# Patient Record
Sex: FEMALE | Race: ASIAN | Employment: STUDENT
[De-identification: names, ages, dates, MRNs, and addresses within clinical notes are randomized per-mention and may not be internally consistent; named-entity substitution may affect disease eponyms.]

---

## 2017-02-02 PROBLEM — R87.810 ASCUS WITH POSITIVE HIGH RISK HPV CERVICAL: Status: ACTIVE | Noted: 2017-02-02

## 2017-02-02 PROBLEM — R87.610 ASCUS WITH POSITIVE HIGH RISK HPV CERVICAL: Status: ACTIVE | Noted: 2017-02-02

## 2017-02-02 PROBLEM — N76.0 BACTERIAL VAGINOSIS: Status: ACTIVE | Noted: 2017-02-02

## 2017-02-02 PROBLEM — B96.89 BACTERIAL VAGINOSIS: Status: ACTIVE | Noted: 2017-02-02

## 2017-02-03 ENCOUNTER — TELEPHONE (OUTPATIENT)
Dept: OBGYN | Facility: CLINIC | Age: 27
End: 2017-02-03

## 2018-05-22 ENCOUNTER — OFFICE VISIT (OUTPATIENT)
Dept: INTERNAL MEDICINE CLINIC | Age: 28
End: 2018-05-22

## 2018-05-22 VITALS
WEIGHT: 110 LBS | DIASTOLIC BLOOD PRESSURE: 52 MMHG | HEIGHT: 62 IN | BODY MASS INDEX: 20.24 KG/M2 | OXYGEN SATURATION: 98 % | SYSTOLIC BLOOD PRESSURE: 88 MMHG | HEART RATE: 90 BPM | RESPIRATION RATE: 18 BRPM

## 2018-05-22 DIAGNOSIS — I95.89 HYPOTENSION, CHRONIC: ICD-10-CM

## 2018-05-22 DIAGNOSIS — Z00.00 ANNUAL PHYSICAL EXAM: Primary | ICD-10-CM

## 2018-05-22 DIAGNOSIS — Z11.1 SCREENING FOR TUBERCULOSIS: ICD-10-CM

## 2018-05-22 PROCEDURE — 99385 PREV VISIT NEW AGE 18-39: CPT | Performed by: INTERNAL MEDICINE

## 2018-05-22 PROCEDURE — 86580 TB INTRADERMAL TEST: CPT | Performed by: INTERNAL MEDICINE

## 2018-05-22 RX ORDER — DROSPIRENONE AND ETHINYL ESTRADIOL 0.02-3(28)
KIT ORAL
COMMUNITY
Start: 2018-05-09

## 2018-05-24 ENCOUNTER — HOSPITAL ENCOUNTER (OUTPATIENT)
Dept: OTHER | Age: 28
Discharge: HOME | End: 2018-05-24
Attending: INTERNAL MEDICINE | Admitting: INTERNAL MEDICINE

## 2018-05-24 ENCOUNTER — NURSE ONLY (OUTPATIENT)
Dept: INTERNAL MEDICINE CLINIC | Age: 28
End: 2018-05-24

## 2018-05-24 DIAGNOSIS — Z00.00 ANNUAL PHYSICAL EXAM: ICD-10-CM

## 2018-05-24 DIAGNOSIS — R91.1 PULMONARY NODULE, LEFT: Primary | ICD-10-CM

## 2018-05-24 DIAGNOSIS — Z11.1 ENCOUNTER FOR PPD SKIN TEST READING: Primary | ICD-10-CM

## 2018-05-24 DIAGNOSIS — Z11.1 TUBERCULOSIS SCREENING: ICD-10-CM

## 2018-05-24 DIAGNOSIS — Z11.1 SCREENING FOR TUBERCULOSIS: Primary | ICD-10-CM

## 2018-05-24 LAB
A/G RATIO: 1.1 (ref 1.1–2.2)
ALBUMIN SERPL-MCNC: 3.8 G/DL (ref 3.4–5)
ALP BLD-CCNC: 66 U/L (ref 40–129)
ALT SERPL-CCNC: 10 U/L (ref 10–40)
ANION GAP SERPL CALCULATED.3IONS-SCNC: 14 MMOL/L (ref 3–16)
AST SERPL-CCNC: 14 U/L (ref 15–37)
BASOPHILS ABSOLUTE: 0 K/UL (ref 0–0.2)
BASOPHILS RELATIVE PERCENT: 0.8 %
BILIRUB SERPL-MCNC: <0.2 MG/DL (ref 0–1)
BUN BLDV-MCNC: 9 MG/DL (ref 7–20)
CALCIUM SERPL-MCNC: 9 MG/DL (ref 8.3–10.6)
CHLORIDE BLD-SCNC: 101 MMOL/L (ref 99–110)
CHOLESTEROL, TOTAL: 208 MG/DL (ref 0–199)
CO2: 22 MMOL/L (ref 21–32)
CREAT SERPL-MCNC: 0.8 MG/DL (ref 0.6–1.1)
EOSINOPHILS ABSOLUTE: 0.1 K/UL (ref 0–0.6)
EOSINOPHILS RELATIVE PERCENT: 1.5 %
GFR AFRICAN AMERICAN: >60
GFR NON-AFRICAN AMERICAN: >60
GLOBULIN: 3.5 G/DL
GLUCOSE BLD-MCNC: 79 MG/DL (ref 70–99)
HCT VFR BLD CALC: 39.4 % (ref 36–48)
HDLC SERPL-MCNC: 94 MG/DL (ref 40–60)
HEMOGLOBIN: 13.4 G/DL (ref 12–16)
INDURATION: ABNORMAL
LDL CHOLESTEROL CALCULATED: 90 MG/DL
LYMPHOCYTES ABSOLUTE: 2.6 K/UL (ref 1–5.1)
LYMPHOCYTES RELATIVE PERCENT: 44 %
MCH RBC QN AUTO: 31 PG (ref 26–34)
MCHC RBC AUTO-ENTMCNC: 34 G/DL (ref 31–36)
MCV RBC AUTO: 90.9 FL (ref 80–100)
MONOCYTES ABSOLUTE: 0.4 K/UL (ref 0–1.3)
MONOCYTES RELATIVE PERCENT: 6.5 %
NEUTROPHILS ABSOLUTE: 2.8 K/UL (ref 1.7–7.7)
NEUTROPHILS RELATIVE PERCENT: 47.2 %
PDW BLD-RTO: 13.4 % (ref 12.4–15.4)
PLATELET # BLD: 284 K/UL (ref 135–450)
PMV BLD AUTO: 10 FL (ref 5–10.5)
POTASSIUM SERPL-SCNC: 4.1 MMOL/L (ref 3.5–5.1)
RBC # BLD: 4.33 M/UL (ref 4–5.2)
SODIUM BLD-SCNC: 137 MMOL/L (ref 136–145)
TB SKIN TEST: ABNORMAL
TOTAL PROTEIN: 7.3 G/DL (ref 6.4–8.2)
TRIGL SERPL-MCNC: 121 MG/DL (ref 0–150)
VLDLC SERPL CALC-MCNC: 24 MG/DL
WBC # BLD: 6 K/UL (ref 4–11)

## 2018-05-31 ENCOUNTER — HOSPITAL ENCOUNTER (OUTPATIENT)
Dept: CT IMAGING | Age: 28
Discharge: HOME | End: 2018-05-31
Attending: INTERNAL MEDICINE | Admitting: INTERNAL MEDICINE

## 2018-05-31 DIAGNOSIS — R91.1 SOLITARY PULMONARY NODULE: ICD-10-CM

## 2018-05-31 DIAGNOSIS — R91.1 PULMONARY NODULE, LEFT: ICD-10-CM

## 2018-07-24 ENCOUNTER — TELEPHONE (OUTPATIENT)
Dept: INTERNAL MEDICINE CLINIC | Age: 28
End: 2018-07-24

## 2018-07-24 NOTE — TELEPHONE ENCOUNTER
Patient called requesting prescription to be transferred to [facility]. Informed patient she can call her previous pharmacy and request it herself and to call if any problems. Patient called back and stated it is actually a prescription for her birth control and Dr Roberto has not prescribed it. Stated Dr Roberto would need to see patient, especially since she has not had a pap by her. Asked patient if she has an OB/GYN? She states yes. Informed her to call them to refill and if any problems to call us back.

## 2022-12-21 ENCOUNTER — TELEMEDICINE (OUTPATIENT)
Dept: INTERNAL MEDICINE CLINIC | Age: 32
End: 2022-12-21

## 2022-12-21 DIAGNOSIS — M25.50 ARTHRALGIA OF MULTIPLE SITES, BILATERAL: Primary | ICD-10-CM

## 2022-12-21 RX ORDER — MELOXICAM 15 MG/1
15 TABLET ORAL DAILY
Qty: 30 TABLET | Refills: 0 | Status: SHIPPED | OUTPATIENT
Start: 2022-12-21

## 2022-12-21 SDOH — ECONOMIC STABILITY: FOOD INSECURITY: WITHIN THE PAST 12 MONTHS, YOU WORRIED THAT YOUR FOOD WOULD RUN OUT BEFORE YOU GOT MONEY TO BUY MORE.: NEVER TRUE

## 2022-12-21 SDOH — ECONOMIC STABILITY: FOOD INSECURITY: WITHIN THE PAST 12 MONTHS, THE FOOD YOU BOUGHT JUST DIDN'T LAST AND YOU DIDN'T HAVE MONEY TO GET MORE.: NEVER TRUE

## 2022-12-21 ASSESSMENT — SOCIAL DETERMINANTS OF HEALTH (SDOH): HOW HARD IS IT FOR YOU TO PAY FOR THE VERY BASICS LIKE FOOD, HOUSING, MEDICAL CARE, AND HEATING?: NOT HARD AT ALL

## 2022-12-21 ASSESSMENT — PATIENT HEALTH QUESTIONNAIRE - PHQ9
2. FEELING DOWN, DEPRESSED OR HOPELESS: 0
SUM OF ALL RESPONSES TO PHQ QUESTIONS 1-9: 0

## 2022-12-21 NOTE — PROGRESS NOTES
Pursuant to the emergency declaration under the [facility] authority and the [employer] and [employer] Act, this Virtual  Video Visit was conducted, with patient's consent, to reduce the patient's risk of exposure to COVID-19 and provide continuity of care. Service is  provided through a video synchronous discussion virtually to substitute for in-person clinic visit with the patient being at home and Dr. Tiana Gupta being at home. Patient consent to the video visit. Date of Service:  12/21/2022    Chief Complaint:      Chief Complaint   Patient presents with    Hip Pain    Wrist Pain       Assessment/Plan:    Dian was seen today for hip pain and wrist pain. Diagnoses and all orders for this visit:    Arthralgia of multiple sites, bilateral  -     meloxicam (MOBIC) 15 MG tablet; Take 1 tablet by mouth daily    Patient to find a rheumatologist in [place] since she will not be home till March    Return Fasting Physical when back in town in March. HPI:  Lydia Gonzalez is a 28 y.o. She got out of the sofa a week ago with right pain and resolve in 5 mins and recur in a few days. The next morning she started having pain in the other hip. She went to Urgent care and given ibuprofen 400 mg bid and flexeril 5 mg daily a few days ago but stop since they didn't help and was upsetting her stomach. Yesterday, she started having bilateral hip pain. [place] She had full body nerve pain in April and she saw a neurologist that did MRI back and labs ok except for CRP elevated with negative ERICK and RF. No history of tick bite or family history connective tissue disease.     No results found for: LABA1C, LABMICR  Lab Results   Component Value Date     05/24/2018    K 4.1 05/24/2018     05/24/2018    CO2 22 05/24/2018    BUN 9 05/24/2018    CREATININE 0.8 05/24/2018    GLUCOSE 79 05/24/2018 CALCIUM 9.0 05/24/2018     Lab Results   Component Value Date/Time    CHOL 208 05/24/2018 01:18 PM    TRIG 121 05/24/2018 01:18 PM    HDL 94 05/24/2018 01:18 PM    LDLCALC 90 05/24/2018 01:18 PM     Lab Results   Component Value Date    ALT 10 05/24/2018    AST 14 (L) 05/24/2018     No results found for: TSH, T4FREE, T3FREE  Lab Results   Component Value Date    WBC 6.0 05/24/2018    HGB 13.4 05/24/2018    HCT 39.4 05/24/2018    MCV 90.9 05/24/2018     05/24/2018     No results found for: INR   No results found for: PSA   No results found for: [facility]     Patient Active Problem List   Diagnosis    ASCUS +HRHPV 9/26/16    H/O bacterial vaginosis    Hypotension, chronic    Pulmonary nodule, left       No Known Allergies  Outpatient Medications Marked as Taking for the 12/21/22 encounter (Telemedicine) with Jessica Malone MD   Medication Sig Dispense Refill    drospirenone-ethinyl estradiol (BRIANNA) 3-0.02 MG per tablet            Review of Systems: 14 systems were negative except of what was stated on HPI    Nursing note and vitals reviewed. There were no vitals filed for this visit. Wt Readings from Last 3 Encounters:   05/22/18 110 lb (49.9 kg)     BP Readings from Last 3 Encounters:   05/22/18 (!) 88/52     There is no height or weight on file to calculate BMI. Constitutional: Patient appears well-developed and well-nourished. No distress. Head: Normocephalic and atraumatic. Psychiatric: Normal mood and affect.  Behavior is normal.

## 2023-06-28 ENCOUNTER — OFFICE VISIT (OUTPATIENT)
Dept: INTERNAL MEDICINE CLINIC | Age: 33
End: 2023-06-28
Payer: COMMERCIAL

## 2023-06-28 VITALS
SYSTOLIC BLOOD PRESSURE: 84 MMHG | BODY MASS INDEX: 20.98 KG/M2 | OXYGEN SATURATION: 99 % | WEIGHT: 114 LBS | HEIGHT: 62 IN | HEART RATE: 84 BPM | DIASTOLIC BLOOD PRESSURE: 52 MMHG

## 2023-06-28 DIAGNOSIS — Z00.00 ANNUAL PHYSICAL EXAM: Primary | ICD-10-CM

## 2023-06-28 DIAGNOSIS — Z30.41 ENCOUNTER FOR SURVEILLANCE OF CONTRACEPTIVE PILLS: ICD-10-CM

## 2023-06-28 PROBLEM — R91.1 PULMONARY NODULE, LEFT: Status: RESOLVED | Noted: 2018-05-24 | Resolved: 2023-06-28

## 2023-06-28 PROBLEM — R87.610 ASCUS WITH POSITIVE HIGH RISK HPV CERVICAL: Status: RESOLVED | Noted: 2017-02-02 | Resolved: 2023-06-28

## 2023-06-28 PROBLEM — N76.0 BACTERIAL VAGINOSIS: Status: RESOLVED | Noted: 2017-02-02 | Resolved: 2023-06-28

## 2023-06-28 PROBLEM — B96.89 BACTERIAL VAGINOSIS: Status: RESOLVED | Noted: 2017-02-02 | Resolved: 2023-06-28

## 2023-06-28 PROBLEM — R87.810 ASCUS WITH POSITIVE HIGH RISK HPV CERVICAL: Status: RESOLVED | Noted: 2017-02-02 | Resolved: 2023-06-28

## 2023-06-28 PROCEDURE — 99395 PREV VISIT EST AGE 18-39: CPT | Performed by: INTERNAL MEDICINE

## 2023-06-28 RX ORDER — DROSPIRENONE AND ETHINYL ESTRADIOL 0.02-3(28)
1 KIT ORAL DAILY
Qty: 3 PACKET | Refills: 3 | Status: SHIPPED | OUTPATIENT
Start: 2023-06-28

## 2023-06-28 SDOH — ECONOMIC STABILITY: HOUSING INSECURITY
IN THE LAST 12 MONTHS, WAS THERE A TIME WHEN YOU DID NOT HAVE A STEADY PLACE TO SLEEP OR SLEPT IN A SHELTER (INCLUDING NOW)?: NO

## 2023-06-28 SDOH — ECONOMIC STABILITY: FOOD INSECURITY: WITHIN THE PAST 12 MONTHS, YOU WORRIED THAT YOUR FOOD WOULD RUN OUT BEFORE YOU GOT MONEY TO BUY MORE.: NEVER TRUE

## 2023-06-28 SDOH — ECONOMIC STABILITY: INCOME INSECURITY: HOW HARD IS IT FOR YOU TO PAY FOR THE VERY BASICS LIKE FOOD, HOUSING, MEDICAL CARE, AND HEATING?: NOT HARD AT ALL

## 2023-06-28 SDOH — ECONOMIC STABILITY: FOOD INSECURITY: WITHIN THE PAST 12 MONTHS, THE FOOD YOU BOUGHT JUST DIDN'T LAST AND YOU DIDN'T HAVE MONEY TO GET MORE.: NEVER TRUE

## 2023-06-28 ASSESSMENT — PATIENT HEALTH QUESTIONNAIRE - PHQ9
SUM OF ALL RESPONSES TO PHQ QUESTIONS 1-9: 0
1. LITTLE INTEREST OR PLEASURE IN DOING THINGS: 0
SUM OF ALL RESPONSES TO PHQ QUESTIONS 1-9: 0
SUM OF ALL RESPONSES TO PHQ QUESTIONS 1-9: 0
SUM OF ALL RESPONSES TO PHQ9 QUESTIONS 1 & 2: 0
SUM OF ALL RESPONSES TO PHQ QUESTIONS 1-9: 0
2. FEELING DOWN, DEPRESSED OR HOPELESS: 0

## 2024-07-08 ENCOUNTER — TELEPHONE (OUTPATIENT)
Dept: INTERNAL MEDICINE CLINIC | Age: 34
End: 2024-07-08

## 2024-07-08 NOTE — TELEPHONE ENCOUNTER
Patient call was transferred from [facility] for red flag word.     Patient states she has been having heart fluttering, but only with exercise, ongoing for 1 year. Patient feels it is not urgent. Currently not having any symptoms. She is also requesting refills of birth control.   Scheduled patient for appointment for 07/10/2024

## 2024-07-10 ENCOUNTER — OFFICE VISIT (OUTPATIENT)
Dept: INTERNAL MEDICINE CLINIC | Age: 34
End: 2024-07-10
Payer: COMMERCIAL

## 2024-07-10 VITALS
DIASTOLIC BLOOD PRESSURE: 58 MMHG | OXYGEN SATURATION: 98 % | WEIGHT: 119 LBS | SYSTOLIC BLOOD PRESSURE: 92 MMHG | HEART RATE: 91 BPM | BODY MASS INDEX: 21.9 KG/M2 | HEIGHT: 62 IN

## 2024-07-10 DIAGNOSIS — R00.2 PALPITATION: ICD-10-CM

## 2024-07-10 DIAGNOSIS — Z30.41 ENCOUNTER FOR SURVEILLANCE OF CONTRACEPTIVE PILLS: ICD-10-CM

## 2024-07-10 DIAGNOSIS — Z00.00 PREVENTATIVE HEALTH CARE: Primary | ICD-10-CM

## 2024-07-10 PROCEDURE — 99213 OFFICE O/P EST LOW 20 MIN: CPT | Performed by: INTERNAL MEDICINE

## 2024-07-10 PROCEDURE — 99395 PREV VISIT EST AGE 18-39: CPT | Performed by: INTERNAL MEDICINE

## 2024-07-10 RX ORDER — DROSPIRENONE AND ETHINYL ESTRADIOL 0.02-3(28)
1 KIT ORAL DAILY
Qty: 1 PACKET | Refills: 2 | Status: SHIPPED | OUTPATIENT
Start: 2024-07-10

## 2024-07-10 SDOH — ECONOMIC STABILITY: FOOD INSECURITY: WITHIN THE PAST 12 MONTHS, YOU WORRIED THAT YOUR FOOD WOULD RUN OUT BEFORE YOU GOT MONEY TO BUY MORE.: NEVER TRUE

## 2024-07-10 SDOH — ECONOMIC STABILITY: FOOD INSECURITY: WITHIN THE PAST 12 MONTHS, THE FOOD YOU BOUGHT JUST DIDN'T LAST AND YOU DIDN'T HAVE MONEY TO GET MORE.: NEVER TRUE

## 2024-07-10 SDOH — ECONOMIC STABILITY: INCOME INSECURITY: HOW HARD IS IT FOR YOU TO PAY FOR THE VERY BASICS LIKE FOOD, HOUSING, MEDICAL CARE, AND HEATING?: NOT HARD AT ALL

## 2024-07-10 ASSESSMENT — PATIENT HEALTH QUESTIONNAIRE - PHQ9
SUM OF ALL RESPONSES TO PHQ QUESTIONS 1-9: 0
SUM OF ALL RESPONSES TO PHQ9 QUESTIONS 1 & 2: 0
SUM OF ALL RESPONSES TO PHQ QUESTIONS 1-9: 0
2. FEELING DOWN, DEPRESSED OR HOPELESS: NOT AT ALL
1. LITTLE INTEREST OR PLEASURE IN DOING THINGS: NOT AT ALL

## 2024-07-10 NOTE — PROGRESS NOTES
[facility]  History and Physical  Teresa Roberto M.D.        Dian Gomez  YOB: 1990    Date of Service:  7/10/2024    Chief Complaint:   Dian Gomez is a 34 y.o. female who presents for   Chief Complaint   Patient presents with    Palpitations    Annual Exam       Assessment/Plan:    Dian was seen today for palpitations and annual exam.    Diagnoses and all orders for this visit:    Preventative health care  -     CBC with Auto Differential; Future  -     Lipid Panel; Future  -     Comprehensive Metabolic Panel; Future  -     TSH; Future    Encounter for surveillance of contraceptive pills  -     drospirenone-ethinyl estradiol (BRIANNA) 3-0.02 MG per tablet; Take 1 tablet by mouth daily    Palpitation  -     [facility]  -     TSH; Future    Patient referred to Dr. Awadalla for freezing her egg      Return Fasting PE in 1 year.      HPI: Here for Annual Physical and Follow up.    She complain of heart fluttering and irregular intermittently while exercising for about a year.  No family history heart issue.    No results found for: \"LABA1C\"  Lab Results   Component Value Date     05/24/2018    K 4.1 05/24/2018     05/24/2018    CO2 22 05/24/2018    BUN 9 05/24/2018    CREATININE 0.8 05/24/2018    GLUCOSE 79 05/24/2018    CALCIUM 9.0 05/24/2018     Lab Results   Component Value Date/Time    CHOL 208 05/24/2018 01:18 PM    TRIG 121 05/24/2018 01:18 PM    HDL 94 05/24/2018 01:18 PM     Lab Results   Component Value Date    ALT 10 05/24/2018    AST 14 (L) 05/24/2018     No results found for: \"TSH\", \"T4FREE\", \"T3FREE\"  Lab Results   Component Value Date    WBC 6.0 05/24/2018    HGB 13.4 05/24/2018    HCT 39.4 05/24/2018    MCV 90.9 05/24/2018     05/24/2018     No results found for: \"INR\"   No results found for: \"PSA\"   No results found for: \"LABURIC\"     Wt Readings from Last 3 Encounters:   07/10/24 54 kg (119 lb)   06/28/23 51.7 kg (114 lb)   05/22/18

## 2024-07-19 LAB
ALBUMIN: 4.2 G/DL
ALP BLD-CCNC: 74 U/L
ALT SERPL-CCNC: 11 U/L
ANION GAP SERPL CALCULATED.3IONS-SCNC: NORMAL MMOL/L
AST SERPL-CCNC: 12 U/L
BASOPHILS ABSOLUTE: 0 /ΜL
BASOPHILS RELATIVE PERCENT: 1 %
BILIRUB SERPL-MCNC: 0.3 MG/DL (ref 0.1–1.4)
BUN BLDV-MCNC: 11 MG/DL
CALCIUM SERPL-MCNC: 9.7 MG/DL
CHLORIDE BLD-SCNC: 102 MMOL/L
CHOLESTEROL, TOTAL: 211 MG/DL
CHOLESTEROL/HDL RATIO: ABNORMAL
CO2: 22 MMOL/L
CREAT SERPL-MCNC: 0.91 MG/DL
EOSINOPHILS ABSOLUTE: 0.1 /ΜL
EOSINOPHILS RELATIVE PERCENT: 2 %
GFR, ESTIMATED: 85
GLUCOSE BLD-MCNC: 93 MG/DL
HCT VFR BLD CALC: 38.1 % (ref 36–46)
HDLC SERPL-MCNC: 79 MG/DL (ref 35–70)
HEMOGLOBIN: 12.6 G/DL (ref 12–16)
LDL CHOLESTEROL: 104
LYMPHOCYTES ABSOLUTE: 2.7 /ΜL
LYMPHOCYTES RELATIVE PERCENT: 44 %
MCH RBC QN AUTO: 30.2 PG
MCHC RBC AUTO-ENTMCNC: 33.1 G/DL
MCV RBC AUTO: 91 FL
MONOCYTES ABSOLUTE: 0.4 /ΜL
MONOCYTES RELATIVE PERCENT: 7 %
NEUTROPHILS ABSOLUTE: 2.9 /ΜL
NEUTROPHILS RELATIVE PERCENT: 46 %
NONHDLC SERPL-MCNC: ABNORMAL MG/DL
PDW BLD-RTO: 13.5 %
PLATELET # BLD: 315 K/ΜL
PMV BLD AUTO: NORMAL FL
POTASSIUM SERPL-SCNC: 4.6 MMOL/L
RBC # BLD: 4.17 10^6/ΜL
SODIUM BLD-SCNC: 136 MMOL/L
TOTAL PROTEIN: 7.1 G/DL (ref 6.4–8.2)
TRIGL SERPL-MCNC: 165 MG/DL
TSH SERPL DL<=0.05 MIU/L-ACNC: 1.44 UIU/ML
VLDLC SERPL CALC-MCNC: 28 MG/DL
WBC # BLD: 6.2 10^3/ML

## 2024-08-30 ENCOUNTER — OFFICE VISIT (OUTPATIENT)
Dept: CARDIOLOGY CLINIC | Age: 34
End: 2024-08-30

## 2024-08-30 ENCOUNTER — TELEPHONE (OUTPATIENT)
Dept: CARDIOLOGY CLINIC | Age: 34
End: 2024-08-30

## 2024-08-30 ENCOUNTER — ANCILLARY PROCEDURE (OUTPATIENT)
Dept: CARDIOLOGY CLINIC | Age: 34
End: 2024-08-30
Payer: COMMERCIAL

## 2024-08-30 VITALS
SYSTOLIC BLOOD PRESSURE: 94 MMHG | HEIGHT: 62 IN | HEART RATE: 99 BPM | OXYGEN SATURATION: 97 % | WEIGHT: 118 LBS | BODY MASS INDEX: 21.71 KG/M2 | DIASTOLIC BLOOD PRESSURE: 54 MMHG

## 2024-08-30 DIAGNOSIS — R00.2 PALPITATIONS: ICD-10-CM

## 2024-08-30 DIAGNOSIS — Z76.89 ESTABLISHING CARE WITH NEW DOCTOR, ENCOUNTER FOR: Primary | ICD-10-CM

## 2024-08-30 PROCEDURE — 93270 REMOTE 30 DAY ECG REV/REPORT: CPT | Performed by: INTERNAL MEDICINE

## 2024-08-30 NOTE — TELEPHONE ENCOUNTER
Monitor placed by [employer]  Monitor company [employer]  Length of monitor 14 days  Monitor ordered by [employer]  Serial number [id number]  Kit ID [id number], [id number]  Activation successful prior to pt leaving office? Yes

## 2024-09-20 LAB — ECHO BSA: 1.53 M2

## 2024-09-20 PROCEDURE — 93272 ECG/REVIEW INTERPRET ONLY: CPT | Performed by: INTERNAL MEDICINE

## 2024-10-14 DIAGNOSIS — Z30.41 ENCOUNTER FOR SURVEILLANCE OF CONTRACEPTIVE PILLS: ICD-10-CM

## 2024-10-14 RX ORDER — DROSPIRENONE AND ETHINYL ESTRADIOL 0.02-3(28)
1 KIT ORAL DAILY
Qty: 28 TABLET | Refills: 9 | Status: SHIPPED | OUTPATIENT
Start: 2024-10-14

## 2024-10-14 NOTE — TELEPHONE ENCOUNTER
LAST REFILL 7/10/24  AMOUNT 1pk     2 REFILLS  LAST VISIT 7/10/24  NEXT VISIT 7/10/25 is when she is due next however hasn't made an appointment yet

## 2024-11-20 ENCOUNTER — OFFICE VISIT (OUTPATIENT)
Dept: INTERNAL MEDICINE CLINIC | Age: 34
End: 2024-11-20

## 2024-11-20 VITALS
HEIGHT: 62 IN | BODY MASS INDEX: 22.26 KG/M2 | HEART RATE: 98 BPM | SYSTOLIC BLOOD PRESSURE: 102 MMHG | OXYGEN SATURATION: 98 % | DIASTOLIC BLOOD PRESSURE: 62 MMHG | WEIGHT: 121 LBS

## 2024-11-20 DIAGNOSIS — R22.0 FACIAL SWELLING: Primary | ICD-10-CM

## 2024-11-20 NOTE — PROGRESS NOTES
Dian Gomez  YOB: 1990    Date of Service:  11/20/2024    Chief Complaint:      Chief Complaint   Patient presents with    Facial Pain     Patient says that she has been having some redness and swelling in the mornings for the last five days and it starts to come down throughout the day but its still painful. Had been trying different face washes and then it started a few days later.        Assessment/Plan:  Dian was seen today for facial pain.    Diagnoses and all orders for this visit:    Facial swelling    Start zyrtec 10 mg qhs    Return if symptoms worsen or fail to improve.      HPI:  Dian Gomez is a 34 y.o.    She was at her friend's house a week ago and used different soap and moisturizer.  Her face started getting flush and red a few days after being at her friend's house.  No need food.  No shortness of breath.  She was concern for Rosacea.  She's been taking Benadryl as needed but it's sedating.    No results found for: \"LABA1C\"  Lab Results   Component Value Date     07/19/2024    K 4.6 07/19/2024     07/19/2024    CO2 22 07/19/2024    BUN 11 07/19/2024    CREATININE 0.91 07/19/2024    GLUCOSE 93 07/19/2024    CALCIUM 9.7 07/19/2024     Lab Results   Component Value Date/Time    CHOL 211 07/19/2024 12:00 AM    TRIG 165 07/19/2024 12:00 AM    HDL 79 07/19/2024 12:00 AM     Lab Results   Component Value Date    ALT 11 07/19/2024    AST 12 07/19/2024     Lab Results   Component Value Date    TSH 1.440 07/19/2024     Lab Results   Component Value Date    WBC 6.2 07/19/2024    HGB 12.6 07/19/2024    HCT 38.1 07/19/2024    MCV 91 07/19/2024     07/19/2024     No results found for: \"INR\"   No results found for: \"PSA\"   No results found for: \"LABURIC\"     Patient Active Problem List   Diagnosis    Hypotension, chronic       No Known Allergies  Outpatient Medications Marked as Taking for the 11/20/24 encounter (Office Visit) with Teresa Roberto MD   Medication Sig Dispense

## 2024-11-20 NOTE — PATIENT INSTRUCTIONS
[facility]:  [phone]  [facility] (8 locations):  [phone]  [facility] [phone]   [facility] [phone]

## 2024-12-02 ENCOUNTER — TELEMEDICINE (OUTPATIENT)
Dept: INTERNAL MEDICINE CLINIC | Age: 34
End: 2024-12-02
Payer: COMMERCIAL

## 2024-12-02 DIAGNOSIS — R23.2 FACIAL FLUSHING: Primary | ICD-10-CM

## 2024-12-02 PROCEDURE — 99213 OFFICE O/P EST LOW 20 MIN: CPT | Performed by: INTERNAL MEDICINE

## 2024-12-02 NOTE — PATIENT INSTRUCTIONS
Allergy & Asthma Dr. Sandhya Liz or Dr. Yinka De La Cruz  [phone]  over at the [address] [place] [address]

## 2024-12-02 NOTE — PROGRESS NOTES
Patient was evaluated through a synchronous (real-time) video encounter. Patient identification was verified at the start of the visit. She (or guardian if applicable) is aware that this is a billable service, which includes applicable co-pays. This visit was conducted with the patient's (and/or legal guardian's) verbal consent. She has not had a related appointment within my department in the past 7 days or scheduled within the next 24 hours.   The patient was located at Home: [address] [place] [id number].  The provider was located at Home (Appt Dept State): [place].    Date of Service:  12/2/2024    Chief Complaint:      Chief Complaint   Patient presents with    Facial Swelling       Assessment/Plan:    Dian was seen today for facial swelling.    Diagnoses and all orders for this visit:    Facial flushing    Change to claritin or allegra that's not as sedating.  Follow up with Allergy & Asthma Dr. Sandhya Liz or Dr. Yinka De La Cruz  [phone]  over at the [address] [place] [address] if not better.      Return if symptoms worsen or fail to improve.      HPI:  Dian Gomez is a 34 y.o.      The heat from the vent at work still causes some facial flushing/swelling and pain although pain is not as bad as before.  She was off her birth control pills when the facial flushing started and she just restarted 6 days ago.  She also went to a Dermatologist and started on doxycycline and some cream but the cream was irritating to her skin.  She's taking zyrtec but get's her too tired.    No results found for: \"LABA1C\"  Lab Results   Component Value Date     07/19/2024    K 4.6 07/19/2024     07/19/2024    CO2 22 07/19/2024    BUN 11 07/19/2024    CREATININE 0.91 07/19/2024    GLUCOSE 93 07/19/2024    CALCIUM 9.7 07/19/2024     Lab Results   Component Value Date/Time    CHOL 211 07/19/2024 12:00 AM    TRIG 165 07/19/2024 12:00 AM    HDL 79 07/19/2024 12:00 AM     Lab Results   Component Value Date    ALT 11 07/19/2024

## 2025-01-15 ENCOUNTER — HOSPITAL ENCOUNTER (OUTPATIENT)
Age: 35
Discharge: HOME | End: 2025-01-15
Payer: COMMERCIAL

## 2025-01-15 ENCOUNTER — TELEMEDICINE (OUTPATIENT)
Dept: INTERNAL MEDICINE CLINIC | Age: 35
End: 2025-01-15
Payer: COMMERCIAL

## 2025-01-15 DIAGNOSIS — R23.2 FACIAL FLUSHING: Primary | ICD-10-CM

## 2025-01-15 DIAGNOSIS — R23.2 FACIAL FLUSHING: ICD-10-CM

## 2025-01-15 LAB — CRP SERPL-MCNC: 10.2 MG/L (ref 0–5.1)

## 2025-01-15 PROCEDURE — 36415 COLL VENOUS BLD VENIPUNCTURE: CPT

## 2025-01-15 PROCEDURE — 86140 C-REACTIVE PROTEIN: CPT

## 2025-01-15 PROCEDURE — 99213 OFFICE O/P EST LOW 20 MIN: CPT | Performed by: INTERNAL MEDICINE

## 2025-01-15 NOTE — PROGRESS NOTES
Patient was evaluated through a synchronous (real-time) video encounter. Patient identification was verified at the start of the visit. She (or guardian if applicable) is aware that this is a billable service, which includes applicable co-pays. This visit was conducted with the patient's (and/or legal guardian's) verbal consent. She has not had a related appointment within my department in the past 7 days or scheduled within the next 24 hours.   The patient was located at Home: [address] [place] [id number].  The provider was located at Facility (Appt Dept): [address] [place] [address], [address]  [place] [address].    Date of Service:  1/15/2025    Chief Complaint:      Chief Complaint   Patient presents with    facial flushing    follow up     After visits with dermatology and allergist       Assessment/Plan:    Dian was seen today for facial flushing and follow up.    Diagnoses and all orders for this visit:    Facial flushing  -     H. Pylori Breath Test; Future  -     C-Reactive Protein; Future      Return if symptoms worsen or fail to improve.      HPI:  Dian Gomez is a 34 y.o.      She saw 3 different dermatologists and an allergist and told to follow up with me to further testing.  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  She also wants to repeat CRP since it was elevated in 2022.  Patient states her facial flushing has been very irritating and affecting her job.    No results found for: \"LABA1C\"  Lab Results   Component Value Date     07/19/2024    K 4.6 07/19/2024     07/19/2024    CO2 22 07/19/2024    BUN 11 07/19/2024    CREATININE 0.91 07/19/2024    GLUCOSE 93 07/19/2024    CALCIUM 9.7 07/19/2024     Lab Results   Component Value Date/Time    CHOL 211 07/19/2024 12:00 AM    TRIG 165 07/19/2024 12:00 AM    HDL 79 07/19/2024 12:00 AM     Lab Results   Component Value

## 2025-01-17 ENCOUNTER — OFFICE VISIT (OUTPATIENT)
Dept: OBGYN CLINIC | Age: 35
End: 2025-01-17

## 2025-01-17 VITALS
DIASTOLIC BLOOD PRESSURE: 60 MMHG | SYSTOLIC BLOOD PRESSURE: 108 MMHG | HEART RATE: 92 BPM | BODY MASS INDEX: 20.74 KG/M2 | WEIGHT: 113.4 LBS | OXYGEN SATURATION: 99 %

## 2025-01-17 DIAGNOSIS — Z12.4 PAP SMEAR FOR CERVICAL CANCER SCREENING: ICD-10-CM

## 2025-01-17 DIAGNOSIS — Z01.419 ENCOUNTER FOR ANNUAL ROUTINE GYNECOLOGICAL EXAMINATION: Primary | ICD-10-CM

## 2025-01-17 DIAGNOSIS — R23.2 FACIAL FLUSHING: ICD-10-CM

## 2025-01-17 DIAGNOSIS — N92.6 IRREGULAR MENSES: ICD-10-CM

## 2025-01-17 RX ORDER — IVERMECTIN 10 MG/G
CREAM TOPICAL
COMMUNITY
Start: 2024-12-13

## 2025-01-17 ASSESSMENT — ENCOUNTER SYMPTOMS
NAUSEA: 0
VOMITING: 0
SHORTNESS OF BREATH: 0
DIARRHEA: 0
ABDOMINAL PAIN: 0
CONSTIPATION: 1

## 2025-01-17 NOTE — PROGRESS NOTES
Annual Exam      CC:   Chief Complaint   Patient presents with    New Patient     Patient complains of Irregular cycles for the last 6 months. Hot flashes would like hormone levels checked       HPI:  34 y.o.  presents for her gynecologic annual exam.    Has also been having some irregular menstrual cycles. Going on for the last 6 months, sometimes will be a few weeks late on her cycle. Also having facial flushing. Have been getting less heavy over the last several years.    No night sweats, some vaginal dryness.     Patient seen and examined.     Review of Systems:   Review of Systems   Constitutional:  Positive for diaphoresis.   Respiratory:  Negative for shortness of breath.    Cardiovascular:  Negative for chest pain.   Gastrointestinal:  Positive for constipation. Negative for abdominal pain, diarrhea, nausea and vomiting.   Genitourinary:  Positive for dyspareunia and menstrual problem. Negative for difficulty urinating, dysuria and vaginal discharge.   Neurological:  Negative for headaches.       Primary Care Physician: Teresa Roberto MD    Obstetric History  OB History    Para Term  AB Living   0 0 0 0 0 0   SAB IAB Ectopic Molar Multiple Live Births   0 0 0 0 0 0       Gynecologic History  Menstrual History:  LMP: 24  Menstrual pattern: usually q28-30d, 2-3d, light, no clots, no cramps  Sexual History:  Contraception: Marlin, on it since age 16  Currently is sexually active  Denies history of STIs  No sexual problems - pain with intercourse  Pap History:  Last pap smear: a few years, normal per pt report  History of abnormal pap smears: did have abnormal pap in high school    Medical History:  Past Medical History:   Diagnosis Date    ASCUS +HRHPV 16    Referred from [facility] in [place] for colposcopy  The patient was counseled on dysplasia and the need for a colposcopic examination.  She is aware that the abnormal findings on her cytologic

## 2025-01-18 LAB — PROLACTIN SERPL IA-MCNC: 26.6 NG/ML

## 2025-01-20 LAB
HPV HR 12 DNA SPEC QL NAA+PROBE: NOT DETECTED
HPV16 DNA SPEC QL NAA+PROBE: NOT DETECTED
HPV16+18+H RISK 12 DNA SPEC-IMP: NORMAL
HPV18 DNA SPEC QL NAA+PROBE: NOT DETECTED

## 2025-01-21 LAB
SHBG SERPL-SCNC: 285 NMOL/L (ref 25–122)
TESTOST FREE SERPL-MCNC: <1 PG/ML (ref 1.3–9.2)
TESTOST SERPL-MCNC: 18 NG/DL (ref 8–48)

## 2025-01-22 LAB — 17OHP SERPL-MCNC: 7.91 NG/DL

## 2025-01-29 ENCOUNTER — HOSPITAL ENCOUNTER (OUTPATIENT)
Age: 35
Discharge: HOME | End: 2025-01-29
Payer: COMMERCIAL

## 2025-01-29 DIAGNOSIS — R23.2 FACIAL FLUSHING: ICD-10-CM

## 2025-01-29 PROCEDURE — 83013 H PYLORI (C-13) BREATH: CPT

## 2025-01-31 LAB — H PYLORI BREATH TEST: NEGATIVE

## 2025-04-12 ENCOUNTER — HOSPITAL ENCOUNTER (OUTPATIENT)
Age: 35
Discharge: HOME | End: 2025-04-12
Payer: COMMERCIAL

## 2025-04-12 LAB
25(OH)D3 SERPL-MCNC: 32.5 NG/ML
BASOPHILS # BLD: 0 K/UL (ref 0–0.2)
BASOPHILS NFR BLD: 0.8 %
CRP SERPL-MCNC: 9.9 MG/L (ref 0–5.1)
DEPRECATED RDW RBC AUTO: 13.5 % (ref 12.4–15.4)
EOSINOPHIL # BLD: 0.1 K/UL (ref 0–0.6)
EOSINOPHIL NFR BLD: 1.3 %
ERYTHROCYTE [SEDIMENTATION RATE] IN BLOOD BY WESTERGREN METHOD: 71 MM/HR (ref 0–20)
FERRITIN SERPL IA-MCNC: 92 NG/ML (ref 15–150)
FOLATE SERPL-MCNC: 9.94 NG/ML (ref 4.78–24.2)
HCT VFR BLD AUTO: 40.6 % (ref 36–48)
HCT VFR BLD AUTO: 40.7 % (ref 36–48)
HGB BLD-MCNC: 13.2 G/DL (ref 12–16)
IMMATURE RETIC FRACT: 0.36 (ref 0.21–0.37)
IRON SATN MFR SERPL: 21 % (ref 15–50)
IRON SERPL-MCNC: 71 UG/DL (ref 37–145)
LYMPHOCYTES # BLD: 1.9 K/UL (ref 1–5.1)
LYMPHOCYTES NFR BLD: 30.6 %
MCH RBC QN AUTO: 29.9 PG (ref 26–34)
MCHC RBC AUTO-ENTMCNC: 32.5 G/DL (ref 31–36)
MCV RBC AUTO: 92.1 FL (ref 80–100)
MONOCYTES # BLD: 0.4 K/UL (ref 0–1.3)
MONOCYTES NFR BLD: 5.7 %
NEUTROPHILS # BLD: 3.8 K/UL (ref 1.7–7.7)
NEUTROPHILS NFR BLD: 61.6 %
PLATELET # BLD AUTO: 310 K/UL (ref 135–450)
PMV BLD AUTO: 9.7 FL (ref 5–10.5)
RBC # BLD AUTO: 4.42 M/UL (ref 4–5.2)
RETICS # AUTO: 0.04 M/UL (ref 0.02–0.1)
RETICS/RBC NFR AUTO: 0.92 % (ref 0.5–2.18)
TIBC SERPL-MCNC: 334 UG/DL (ref 260–445)
VIT B12 SERPL-MCNC: 468 PG/ML (ref 211–911)
WBC # BLD AUTO: 6.2 K/UL (ref 4–11)

## 2025-04-12 PROCEDURE — 83540 ASSAY OF IRON: CPT

## 2025-04-12 PROCEDURE — 82607 VITAMIN B-12: CPT

## 2025-04-12 PROCEDURE — 84425 ASSAY OF VITAMIN B-1: CPT

## 2025-04-12 PROCEDURE — 84591 ASSAY OF NOS VITAMIN: CPT

## 2025-04-12 PROCEDURE — 85652 RBC SED RATE AUTOMATED: CPT

## 2025-04-12 PROCEDURE — 82306 VITAMIN D 25 HYDROXY: CPT

## 2025-04-12 PROCEDURE — 82785 ASSAY OF IGE: CPT

## 2025-04-12 PROCEDURE — 82728 ASSAY OF FERRITIN: CPT

## 2025-04-12 PROCEDURE — 83520 IMMUNOASSAY QUANT NOS NONAB: CPT

## 2025-04-12 PROCEDURE — 84252 ASSAY OF VITAMIN B-2: CPT

## 2025-04-12 PROCEDURE — 83550 IRON BINDING TEST: CPT

## 2025-04-12 PROCEDURE — 86140 C-REACTIVE PROTEIN: CPT

## 2025-04-12 PROCEDURE — 82746 ASSAY OF FOLIC ACID SERUM: CPT

## 2025-04-12 PROCEDURE — 85045 AUTOMATED RETICULOCYTE COUNT: CPT

## 2025-04-12 PROCEDURE — 36415 COLL VENOUS BLD VENIPUNCTURE: CPT

## 2025-04-12 PROCEDURE — 85025 COMPLETE CBC W/AUTO DIFF WBC: CPT

## 2025-04-14 ENCOUNTER — HOSPITAL ENCOUNTER (OUTPATIENT)
Age: 35
Discharge: HOME | End: 2025-04-14
Payer: COMMERCIAL

## 2025-04-14 LAB — IGE SERPL-ACNC: 72 KU/L

## 2025-04-14 PROCEDURE — 84150 ASSAY OF PROSTAGLANDIN: CPT

## 2025-04-14 PROCEDURE — 82542 COL CHROMOTOGRAPHY QUAL/QUAN: CPT

## 2025-04-15 LAB — TRYPTASE SERPL-MCNC: 2.7 UG/L

## 2025-04-16 LAB — VIT B1 SERPL-MCNC: 6 NMOL/L (ref 4–15)

## 2025-04-17 LAB — VIT B2 SERPL-SCNC: 2 NMOL/L (ref 5–50)

## 2025-04-18 LAB
NIACIN SERPL-MCNC: NORMAL NG/ML
NICOTINAMIDE SERPL-MCNC: 45 NG/ML
NICOTINURATE SERPL-MCNC: NORMAL NG/ML

## 2025-04-22 LAB — MISCELLANEOUS LAB TEST ORDER: ABNORMAL

## 2025-04-29 LAB — MISCELLANEOUS LAB TEST ORDER: NORMAL

## 2025-04-30 LAB — MISCELLANEOUS LAB TEST ORDER: ABNORMAL

## 2025-05-28 DIAGNOSIS — Z30.41 ENCOUNTER FOR SURVEILLANCE OF CONTRACEPTIVE PILLS: ICD-10-CM

## 2025-05-28 RX ORDER — DROSPIRENONE AND ETHINYL ESTRADIOL 0.02-3(28)
1 KIT ORAL DAILY
Qty: 28 TABLET | Refills: 9 | OUTPATIENT
Start: 2025-05-28

## 2025-07-23 DIAGNOSIS — Z30.41 ENCOUNTER FOR SURVEILLANCE OF CONTRACEPTIVE PILLS: ICD-10-CM

## 2025-07-28 RX ORDER — DROSPIRENONE AND ETHINYL ESTRADIOL 0.02-3(28)
1 KIT ORAL DAILY
Qty: 28 TABLET | Refills: 9 | OUTPATIENT
Start: 2025-07-28

## 2025-08-09 ENCOUNTER — PATIENT MESSAGE (OUTPATIENT)
Dept: OBGYN CLINIC | Age: 35
End: 2025-08-09

## 2025-08-09 DIAGNOSIS — Z30.41 ENCOUNTER FOR SURVEILLANCE OF CONTRACEPTIVE PILLS: ICD-10-CM

## 2025-08-11 RX ORDER — DROSPIRENONE AND ETHINYL ESTRADIOL 0.02-3(28)
1 KIT ORAL DAILY
Qty: 28 TABLET | Refills: 9 | Status: SHIPPED | OUTPATIENT
Start: 2025-08-11